# Patient Record
(demographics unavailable — no encounter records)

---

## 2025-01-30 NOTE — HISTORY OF PRESENT ILLNESS
[Never] : never [TextBox_4] : Noted to have double vision.  Seeing neurologist and has pending appointment with neuro-ophthalmologist.  No headache.  Generally feels okay but noted to be hypertensive.  Head MRI reportedly negative

## 2025-01-30 NOTE — ASSESSMENT
[FreeTextEntry1] : Multiple ongoing medical problems now with double vision.  Check full labs.  Await neuro-ophthalmology evaluation Blood pressure elevated start amlodipine

## 2025-02-12 NOTE — ASSESSMENT
[FreeTextEntry1] : This is a 67 year old man with a history of M pre-diabetes, hemorrhoids, thyroid nodule, GERD referred from PMD Dr. Luz for evaluation of thrombocytopenia. 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 Patients CBC was never particularly low nor did it return to normal for years.  Probable mild ongoing ITP versus CCS MDS.  HG 12.0g/dl Platelets 122k/ul. These are close enough to normal to remain entirely asymptotic will continue to follow counts, If Plts fall below 100, or Hg Falls below 10 can proceed to bone marrow biopsy for a more definitive diagnosis, otherwise would observe.  patient does have some diplopia which is thus far inexplicable. MRI negative for tumor.  Patient will see a neuropathologist for Further investigation soon.

## 2025-02-12 NOTE — HISTORY OF PRESENT ILLNESS
[de-identified] : Patient  [de-identified] : Patient returns for follow up of a mild anemia and thrombocytopenia. Hg 12.0g/dl plats 122k/ul both slightly lower than usual  but not severe enough to be symptomatic.    Patient noticed diplopia for 3 weeks. Saw Dr. Ledesma, and an ophthalmologist and a neurologist who recommended a neuropathologist.  Does not get any better during the day. worse when looking down and right.   1/26/25 Brain MRI was negative.

## 2025-02-12 NOTE — HISTORY OF PRESENT ILLNESS
[de-identified] : Patient  [de-identified] : Patient returns for follow up of a mild anemia and thrombocytopenia. Hg 12.0g/dl plats 122k/ul both slightly lower than usual  but not severe enough to be symptomatic.    Patient noticed diplopia for 3 weeks. Saw Dr. Ledesma, and an ophthalmologist and a neurologist who recommended a neuropathologist.  Does not get any better during the day. worse when looking down and right.   1/26/25 Brain MRI was negative.

## 2025-02-25 NOTE — HISTORY OF PRESENT ILLNESS
[Never] : never [TextBox_4] : Prior: Noted to have double vision.  Seeing neurologist and has pending appointment with neuro-ophthalmologist.  No headache.  Generally feels okay but noted to be hypertensive.  Head MRI reportedly negative Current: Double vision improving being followed by neuro ophthalmology Blood pressure in better control Here for follow-up Seen by hematology for anemia and thrombocytopenia no intervention recommended

## 2025-02-25 NOTE — ASSESSMENT
[FreeTextEntry1] : Blood pressure improved continue atorvastatin.  Cholesterol was slightly elevated at last visit we will recheck and likely start statin.  Recheck serum creatinine.

## 2025-05-27 NOTE — HISTORY OF PRESENT ILLNESS
[Never] : never [TextBox_4] : 68 M here for f/u visit.  Double vision improving Blood pressure in better control with medication

## 2025-05-27 NOTE — ADDENDUM
[FreeTextEntry1] : I, Marco Felix, acted solely as a scribe for Dr. Zion Luz M.D. on this date 05/27/2025.   All medical record entries made by the Scribe were at my, Dr. Zion Luz M.D., direction and personally dictated by me on 05/27/2025. I have reviewed the chart and agree that the record accurately reflects my personal performance of the history, physical exam, assessment and plan. I have also personally directed, reviewed, and agreed with the chart.